# Patient Record
Sex: MALE | Race: WHITE | HISPANIC OR LATINO | URBAN - METROPOLITAN AREA
[De-identification: names, ages, dates, MRNs, and addresses within clinical notes are randomized per-mention and may not be internally consistent; named-entity substitution may affect disease eponyms.]

---

## 2024-11-27 ENCOUNTER — HOSPITAL ENCOUNTER (EMERGENCY)
Facility: MEDICAL CENTER | Age: 29
End: 2024-11-27
Attending: EMERGENCY MEDICINE

## 2024-11-27 ENCOUNTER — PHARMACY VISIT (OUTPATIENT)
Dept: PHARMACY | Facility: MEDICAL CENTER | Age: 29
End: 2024-11-27
Payer: COMMERCIAL

## 2024-11-27 ENCOUNTER — APPOINTMENT (OUTPATIENT)
Dept: RADIOLOGY | Facility: MEDICAL CENTER | Age: 29
End: 2024-11-27
Attending: EMERGENCY MEDICINE

## 2024-11-27 VITALS
DIASTOLIC BLOOD PRESSURE: 80 MMHG | TEMPERATURE: 97.5 F | SYSTOLIC BLOOD PRESSURE: 132 MMHG | HEIGHT: 68 IN | BODY MASS INDEX: 27.28 KG/M2 | WEIGHT: 180 LBS | HEART RATE: 99 BPM | RESPIRATION RATE: 14 BRPM | OXYGEN SATURATION: 95 %

## 2024-11-27 DIAGNOSIS — R07.9 CHEST PAIN, UNSPECIFIED TYPE: Primary | ICD-10-CM

## 2024-11-27 DIAGNOSIS — S29.011A INTERCOSTAL MUSCLE STRAIN, INITIAL ENCOUNTER: ICD-10-CM

## 2024-11-27 LAB
ALBUMIN SERPL BCP-MCNC: 4.4 G/DL (ref 3.2–4.9)
ALBUMIN/GLOB SERPL: 1.6 G/DL
ALP SERPL-CCNC: 91 U/L (ref 30–99)
ALT SERPL-CCNC: 36 U/L (ref 2–50)
ANION GAP SERPL CALC-SCNC: 14 MMOL/L (ref 7–16)
AST SERPL-CCNC: 25 U/L (ref 12–45)
BASOPHILS # BLD AUTO: 0.5 % (ref 0–1.8)
BASOPHILS # BLD: 0.06 K/UL (ref 0–0.12)
BILIRUB SERPL-MCNC: 0.2 MG/DL (ref 0.1–1.5)
BUN SERPL-MCNC: 22 MG/DL (ref 8–22)
CALCIUM ALBUM COR SERPL-MCNC: 8.7 MG/DL (ref 8.5–10.5)
CALCIUM SERPL-MCNC: 9 MG/DL (ref 8.5–10.5)
CHLORIDE SERPL-SCNC: 102 MMOL/L (ref 96–112)
CO2 SERPL-SCNC: 22 MMOL/L (ref 20–33)
CREAT SERPL-MCNC: 0.99 MG/DL (ref 0.5–1.4)
EKG IMPRESSION: NORMAL
EOSINOPHIL # BLD AUTO: 0.24 K/UL (ref 0–0.51)
EOSINOPHIL NFR BLD: 2.1 % (ref 0–6.9)
ERYTHROCYTE [DISTWIDTH] IN BLOOD BY AUTOMATED COUNT: 40 FL (ref 35.9–50)
FLUAV RNA SPEC QL NAA+PROBE: NEGATIVE
FLUBV RNA SPEC QL NAA+PROBE: NEGATIVE
GFR SERPLBLD CREATININE-BSD FMLA CKD-EPI: 107 ML/MIN/1.73 M 2
GLOBULIN SER CALC-MCNC: 2.8 G/DL (ref 1.9–3.5)
GLUCOSE SERPL-MCNC: 107 MG/DL (ref 65–99)
HCT VFR BLD AUTO: 46.2 % (ref 42–52)
HGB BLD-MCNC: 15.7 G/DL (ref 14–18)
IMM GRANULOCYTES # BLD AUTO: 0.03 K/UL (ref 0–0.11)
IMM GRANULOCYTES NFR BLD AUTO: 0.3 % (ref 0–0.9)
LYMPHOCYTES # BLD AUTO: 2.84 K/UL (ref 1–4.8)
LYMPHOCYTES NFR BLD: 25.3 % (ref 22–41)
MCH RBC QN AUTO: 29.5 PG (ref 27–33)
MCHC RBC AUTO-ENTMCNC: 34 G/DL (ref 32.3–36.5)
MCV RBC AUTO: 86.8 FL (ref 81.4–97.8)
MONOCYTES # BLD AUTO: 0.89 K/UL (ref 0–0.85)
MONOCYTES NFR BLD AUTO: 7.9 % (ref 0–13.4)
NEUTROPHILS # BLD AUTO: 7.17 K/UL (ref 1.82–7.42)
NEUTROPHILS NFR BLD: 63.9 % (ref 44–72)
NRBC # BLD AUTO: 0 K/UL
NRBC BLD-RTO: 0 /100 WBC (ref 0–0.2)
NT-PROBNP SERPL IA-MCNC: <36 PG/ML (ref 0–125)
PLATELET # BLD AUTO: 317 K/UL (ref 164–446)
PMV BLD AUTO: 9.4 FL (ref 9–12.9)
POTASSIUM SERPL-SCNC: 3.9 MMOL/L (ref 3.6–5.5)
PROT SERPL-MCNC: 7.2 G/DL (ref 6–8.2)
RBC # BLD AUTO: 5.32 M/UL (ref 4.7–6.1)
RSV RNA SPEC QL NAA+PROBE: NEGATIVE
SARS-COV-2 RNA RESP QL NAA+PROBE: NOTDETECTED
SODIUM SERPL-SCNC: 138 MMOL/L (ref 135–145)
TROPONIN T SERPL-MCNC: <6 NG/L (ref 6–19)
WBC # BLD AUTO: 11.2 K/UL (ref 4.8–10.8)

## 2024-11-27 PROCEDURE — 83880 ASSAY OF NATRIURETIC PEPTIDE: CPT

## 2024-11-27 PROCEDURE — 93005 ELECTROCARDIOGRAM TRACING: CPT | Performed by: EMERGENCY MEDICINE

## 2024-11-27 PROCEDURE — 85025 COMPLETE CBC W/AUTO DIFF WBC: CPT

## 2024-11-27 PROCEDURE — 80053 COMPREHEN METABOLIC PANEL: CPT

## 2024-11-27 PROCEDURE — 36415 COLL VENOUS BLD VENIPUNCTURE: CPT

## 2024-11-27 PROCEDURE — 93005 ELECTROCARDIOGRAM TRACING: CPT

## 2024-11-27 PROCEDURE — 99285 EMERGENCY DEPT VISIT HI MDM: CPT

## 2024-11-27 PROCEDURE — 84484 ASSAY OF TROPONIN QUANT: CPT

## 2024-11-27 PROCEDURE — RXMED WILLOW AMBULATORY MEDICATION CHARGE: Performed by: EMERGENCY MEDICINE

## 2024-11-27 PROCEDURE — A9270 NON-COVERED ITEM OR SERVICE: HCPCS | Performed by: EMERGENCY MEDICINE

## 2024-11-27 PROCEDURE — 700102 HCHG RX REV CODE 250 W/ 637 OVERRIDE(OP): Performed by: EMERGENCY MEDICINE

## 2024-11-27 PROCEDURE — 71045 X-RAY EXAM CHEST 1 VIEW: CPT

## 2024-11-27 PROCEDURE — 0241U HCHG SARS-COV-2 COVID-19 NFCT DS RESP RNA 4 TRGT ED POC: CPT

## 2024-11-27 RX ORDER — IBUPROFEN 800 MG/1
800 TABLET, FILM COATED ORAL EVERY 8 HOURS PRN
Qty: 9 TABLET | Refills: 0 | Status: SHIPPED | OUTPATIENT
Start: 2024-11-27 | End: 2024-11-30

## 2024-11-27 RX ORDER — ACETAMINOPHEN 500 MG
1000 TABLET ORAL ONCE
Status: COMPLETED | OUTPATIENT
Start: 2024-11-27 | End: 2024-11-27

## 2024-11-27 RX ORDER — ACETAMINOPHEN 500 MG
1000 TABLET ORAL EVERY 6 HOURS PRN
Qty: 20 TABLET | Refills: 0 | Status: SHIPPED | OUTPATIENT
Start: 2024-11-27 | End: 2024-12-01

## 2024-11-27 RX ADMIN — ACETAMINOPHEN 1000 MG: 500 TABLET ORAL at 20:50

## 2024-11-27 RX ADMIN — IBUPROFEN 800 MG: 200 TABLET, FILM COATED ORAL at 20:51

## 2024-11-27 ASSESSMENT — PAIN DESCRIPTION - PAIN TYPE: TYPE: ACUTE PAIN

## 2024-11-28 NOTE — ED PROVIDER NOTES
ED Provider Note    Scribed for Bj Donnelly by Uzair Mccullough. 11/27/2024  8:23 PM    Primary care provider: None noted  Means of arrival: walk in  History obtained from: Patient  History limited by: None    CHIEF COMPLAINT  Chief Complaint   Patient presents with    Shortness of Breath    Chest Pain     EXTERNAL RECORDS REVIEWED  None available    HPI/ROS    LIMITATION TO HISTORY   Select: Language Nigerien,  Used     HPI  Siddhartha Caballero is a 29 y.o. male who presents to the Emergency Department for chest pain onset four days ago. He states that on Sunday he has had left sided chest pain, it first felt like a muscle cramp but then after having pain he had shortness of breath. He has not had any recent illness but today developed non productive cough. He denies any fever, nausea, vomiting, or history of cardiac issues. He does socially drink, no major medical problems or daily medications.     REVIEW OF SYSTEMS  As above, all other systems reviewed and are negative.   See HPI for further details.     PAST MEDICAL HISTORY     SURGICAL HISTORY  patient denies any surgical history  SOCIAL HISTORY  Social History     Tobacco Use    Smoking status: Never   Vaping Use    Vaping status: Never Used   Substance Use Topics    Alcohol use: Yes     Comment: occ    Drug use: Never      Social History     Substance and Sexual Activity   Drug Use Never     FAMILY HISTORY  History reviewed. No pertinent family history.  CURRENT MEDICATIONS  Home Medications       Reviewed by Monica Gonzalez R.N. (Registered Nurse) on 11/27/24 at [facility]  Med List Status: Partial     Medication Last Dose Status        Patient Devon Taking any Medications                         Audit from Redirected Encounters    **Home medications have not yet been reviewed for this encounter**       ALLERGIES  No Known Allergies    PHYSICAL EXAM    VITAL SIGNS:   Vitals:    11/27/24 2033 11/27/24 2108 11/27/24 2138 11/27/24 2208   BP: 133/81 113/76  118/78 126/75   Pulse: 100 95 98 (!) 108   Resp:  (!) 21 20 (!) 23   Temp:       TempSrc:       SpO2: 95% 94% 97% 97%   Weight:       Height:         Vitals: My interpretation: hypertensive, not tachycardic, afebrile, not hypoxic    Reinterpretation of vitals: Unchanged unremarkable    Cardiac Monitor Interpretation: The cardiac monitor revealed normal Sinus Rhythm as interpreted by me. The cardiac monitor was ordered secondary to the patient's history of chest pain and to monitor for dysrhythmia and/or tachycardia.    PE:   Gen: sitting comfortably, speaking clearly, appears in no acute distress   ENT: Mucous membranes moist, posterior pharynx clear, uvula midline, nares patent bilaterally   Neck: Supple, FROM  Pulmonary: Lungs are clear to auscultation bilaterally. No tachypnea  CV:  RRR, no murmur appreciated, pulses 2+ in both upper and lower extremities  Abdomen: soft, NT/ND; no rebound/guarding  : no CVA or suprapubic tenderness   Neuro: A&Ox4 (person, place, time, situation), speech fluent, gait steady, no focal deficits appreciated  Skin: No rash or lesions.  No pallor or jaundice.  No cyanosis.  Warm and dry.     DIAGNOSTIC STUDIES / PROCEDURES    LABS  Results for orders placed or performed during the hospital encounter of 24   EKG (NOW)    Collection Time: 24  7:46 PM   Result Value Ref Range    Report       [facility] Emergency Dept.    Test Date:  2024  Pt Name:    MERRITT YOST                 Department: ER  MRN:        [id number]                      Room:  Gender:     Male                         Technician: [id number]  :        1995                   Requested By:ER TRIAGE PROTOCOL  Order #:    [id number]                    Raz MD: Bj Donnelly    Measurements  Intervals                                Axis  Rate:       107                          P:          61  NY:         148                          QRS:        101  QRSD:       141                           T:          -10  QT:         365  QTc:        487    Interpretive Statements  Sinus tachycardia  RBBB and LPFB  No previous ECG available for comparison  Electronically Signed On 11- 20:44:12 PST by Bj Donnelly     CBC WITH DIFFERENTIAL    Collection Time: 11/27/24  8:52 PM   Result Value Ref Range    WBC 11.2 (H) 4.8 - 10.8 K/uL    RBC 5.32 4.70 - 6.10 M/uL    Hemoglobin 15.7 14.0 - 18.0 g/dL    Hematocrit 46.2 42.0 - 52.0 %    MCV 86.8 81.4 - 97.8 fL    MCH 29.5 27.0 - 33.0 pg    MCHC 34.0 32.3 - 36.5 g/dL    RDW 40.0 35.9 - 50.0 fL    Platelet Count 317 164 - 446 K/uL    MPV 9.4 9.0 - 12.9 fL    Neutrophils-Polys 63.90 44.00 - 72.00 %    Lymphocytes 25.30 22.00 - 41.00 %    Monocytes 7.90 0.00 - 13.40 %    Eosinophils 2.10 0.00 - 6.90 %    Basophils 0.50 0.00 - 1.80 %    Immature Granulocytes 0.30 0.00 - 0.90 %    Nucleated RBC 0.00 0.00 - 0.20 /100 WBC    Neutrophils (Absolute) 7.17 1.82 - 7.42 K/uL    Lymphs (Absolute) 2.84 1.00 - 4.80 K/uL    Monos (Absolute) 0.89 (H) 0.00 - 0.85 K/uL    Eos (Absolute) 0.24 0.00 - 0.51 K/uL    Baso (Absolute) 0.06 0.00 - 0.12 K/uL    Immature Granulocytes (abs) 0.03 0.00 - 0.11 K/uL    NRBC (Absolute) 0.00 K/uL   COMP METABOLIC PANEL    Collection Time: 11/27/24  8:52 PM   Result Value Ref Range    Sodium 138 135 - 145 mmol/L    Potassium 3.9 3.6 - 5.5 mmol/L    Chloride 102 96 - 112 mmol/L    Co2 22 20 - 33 mmol/L    Anion Gap 14.0 7.0 - 16.0    Glucose 107 (H) 65 - 99 mg/dL    Bun 22 8 - 22 mg/dL    Creatinine 0.99 0.50 - 1.40 mg/dL    Calcium 9.0 8.5 - 10.5 mg/dL    Correct Calcium 8.7 8.5 - 10.5 mg/dL    AST(SGOT) 25 12 - 45 U/L    ALT(SGPT) 36 2 - 50 U/L    Alkaline Phosphatase 91 30 - 99 U/L    Total Bilirubin 0.2 0.1 - 1.5 mg/dL    Albumin 4.4 3.2 - 4.9 g/dL    Total Protein 7.2 6.0 - 8.2 g/dL    Globulin 2.8 1.9 - 3.5 g/dL    A-G Ratio 1.6 g/dL   proBrain Natriuretic Peptide, NT    Collection Time: 11/27/24  8:52 PM   Result Value Ref Range     NT-proBNP <36 0 - 125 pg/mL   ESTIMATED GFR    Collection Time: 11/27/24  8:52 PM   Result Value Ref Range    GFR (CKD-EPI) 107 >60 mL/min/1.73 m 2   POC CoV-2, FLU A/B, RSV by PCR    Collection Time: 11/27/24  9:08 PM   Result Value Ref Range    POC Influenza A RNA, PCR Negative Negative    POC Influenza B RNA, PCR Negative Negative    POC RSV, by PCR Negative Negative    POC SARS-CoV-2, PCR NotDetected NotDetected   TROPONIN    Collection Time: 11/27/24 10:09 PM   Result Value Ref Range    Troponin T <6 6 - 19 ng/L      All labs reviewed by me. Labs were compared to prior labs if they were available. Significant for no leukocytosis, no anemia, normal electrolytes, normal glucose, normal renal function, normal liver enzymes, normal bilirubin, BNP negative, flu, COVID, RSV negative, troponin negative    RADIOLOGY  I have independently interpreted the diagnostic imaging associated with this visit and am waiting the final reading from the radiologist.   My preliminary interpretation is a follows: On my independent interpretation patient has no new significant cardiomegaly or focal consolidative process on CXR.     Radiologist interpretation is as follows:  DX-CHEST-PORTABLE (1 VIEW)   Final Result      Hypoinflation with mild bibasilar atelectasis.        COURSE & MEDICAL DECISION MAKING  Nursing notes, VS, PMSFHx, labs, imaging, EKG reviewed in chart.    ED Observation Status? No; Patient does not meet criteria for ED Observation.     Ddx: Flu, COVID, RSV, pneumonia, PE, MI, costochondritis, intercostal muscle spasm    Heart score: Low    MDM: 8:23 PM Siddhartha Caballero is a 29 y.o. male who presented with about a week of intermittent left sided rib and chest wall pain.  No cardiac history, otherwise healthy, no alcohol or tobacco use in the excessive fashion.  Denies shortness of breath, cough or flulike symptoms.  By arrival here patient has normal vital signs.  He is not tachycardic.  Not hypoxic.  EKG thankfully shows  no ischemic changes or arrhythmia.  His chest x-ray on my independent interpretation shows no signs of consolidative process or cardiomegaly. All labs reviewed by me. Labs were compared to prior labs if they were available. Significant for no leukocytosis, no anemia, normal electrolytes, normal glucose, normal renal function, normal liver enzymes, normal bilirubin, BNP negative, flu, COVID, RSV negative, troponin negative.  Patient symptoms are very consistent with likely intercostal muscle spasm.  He feels improved after Tylenol Motrin administration here.  Will prescribe the same as an outpatient.  He will follow-up with his primary care team for further evaluation.  Return precautions were discussed with him and his wife at bedside and verbalized understanding and are amenable.    ADDITIONAL PROBLEM LIST AND DISPOSITION    I have discussed management of the patient with the following physicians and CHENTE's: None    Discussion of management with other QHP or appropriate source(s): None     Escalation of care considered, and ultimately not performed:acute inpatient care management, however at this time, the patient is most appropriate for outpatient management    Barriers to care at this time, including but not limited to: None    Decision tools and prescription drugs considered including, but not limited to: Pain Medications given here .    FINAL IMPRESSION  1. Chest pain, unspecified type Acute   2. Intercostal muscle strain, initial encounter Acute      Uzair TURCIOS (Scribe), am scribing for, and in the presence of, Bj Donnelly.    Electronically signed by: Uzair Mccullough (Kostas), 11/27/2024    IBj personally performed the services described in this documentation, as scribed by Uzair Mccullough in my presence, and it is both accurate and complete.    The note accurately reflects work and decisions made by me.  Bj Donnelly  11/27/2024  10:17 PM

## 2024-11-28 NOTE — ED TRIAGE NOTES
Chief Complaint   Patient presents with    Shortness of Breath    Chest Pain       Pt to triage ambulatory for above complaint. Presents with chest pain started last Saturday; does not radiate, states each time it hurts he also have SOB.  Denies any N/V.    Pt back to lobby, educated on triage process and encourage to alert staff of any changes.     Vitals:    11/27/24 1938   BP: (!) 178/100   Pulse: 94   Resp: 18   Temp: 36.4 °C (97.5 °F)   SpO2: 98%

## 2024-11-28 NOTE — DISCHARGE INSTRUCTIONS
Thankfully, your workup here is negative.  I want you follow-up with your primary care team for further evaluation treatment.  You can take Tylenol Motrin to help with the muscle spasms in your ribs.  30 worsen symptoms or concerns please return to the ED.  Thank you for coming today.    Afortunadamente, hutton análisis aquí es negativo.  Quiero que ayana un seguimiento con hutton equipo de atención primaria para mary evaluación adicional del tratamiento.  Puede cyrus Tylenol Motrin para ayudar con los espasmos musculares en las costillas.  30 síntomas o inquietudes que empeoran, regrese al servicio de urgencias.  Shazia por venir janet.